# Patient Record
Sex: MALE | Race: WHITE | NOT HISPANIC OR LATINO | Employment: UNEMPLOYED | URBAN - METROPOLITAN AREA
[De-identification: names, ages, dates, MRNs, and addresses within clinical notes are randomized per-mention and may not be internally consistent; named-entity substitution may affect disease eponyms.]

---

## 2018-05-12 ENCOUNTER — HOSPITAL ENCOUNTER (EMERGENCY)
Facility: MEDICAL CENTER | Age: 23
End: 2018-05-12
Attending: EMERGENCY MEDICINE
Payer: COMMERCIAL

## 2018-05-12 ENCOUNTER — APPOINTMENT (OUTPATIENT)
Dept: RADIOLOGY | Facility: MEDICAL CENTER | Age: 23
End: 2018-05-12
Attending: EMERGENCY MEDICINE
Payer: COMMERCIAL

## 2018-05-12 VITALS
TEMPERATURE: 98.2 F | HEIGHT: 69 IN | SYSTOLIC BLOOD PRESSURE: 118 MMHG | OXYGEN SATURATION: 96 % | RESPIRATION RATE: 17 BRPM | WEIGHT: 205 LBS | BODY MASS INDEX: 30.36 KG/M2 | DIASTOLIC BLOOD PRESSURE: 54 MMHG | HEART RATE: 85 BPM

## 2018-05-12 DIAGNOSIS — F10.920 ALCOHOLIC INTOXICATION WITHOUT COMPLICATION (HCC): ICD-10-CM

## 2018-05-12 DIAGNOSIS — S61.209A FLEXOR TENDON LACERATION OF FINGER WITH OPEN WOUND, INITIAL ENCOUNTER: ICD-10-CM

## 2018-05-12 DIAGNOSIS — S61.219A FINGER LACERATION INVOLVING TENDON, INITIAL ENCOUNTER: ICD-10-CM

## 2018-05-12 DIAGNOSIS — S56.129A FLEXOR TENDON LACERATION OF FINGER WITH OPEN WOUND, INITIAL ENCOUNTER: ICD-10-CM

## 2018-05-12 PROCEDURE — 304999 HCHG REPAIR-SIMPLE/INTERMED LEVEL 1

## 2018-05-12 PROCEDURE — 700111 HCHG RX REV CODE 636 W/ 250 OVERRIDE (IP): Performed by: EMERGENCY MEDICINE

## 2018-05-12 PROCEDURE — 90715 TDAP VACCINE 7 YRS/> IM: CPT | Performed by: EMERGENCY MEDICINE

## 2018-05-12 PROCEDURE — 303485 HCHG DRESSING MEDIUM

## 2018-05-12 PROCEDURE — 303747 HCHG EXTRA SUTURE

## 2018-05-12 PROCEDURE — 99284 EMERGENCY DEPT VISIT MOD MDM: CPT

## 2018-05-12 PROCEDURE — 90471 IMMUNIZATION ADMIN: CPT

## 2018-05-12 PROCEDURE — 700102 HCHG RX REV CODE 250 W/ 637 OVERRIDE(OP): Performed by: EMERGENCY MEDICINE

## 2018-05-12 PROCEDURE — 304217 HCHG IRRIGATION SYSTEM

## 2018-05-12 PROCEDURE — 73140 X-RAY EXAM OF FINGER(S): CPT | Mod: LT

## 2018-05-12 PROCEDURE — A9270 NON-COVERED ITEM OR SERVICE: HCPCS | Performed by: EMERGENCY MEDICINE

## 2018-05-12 RX ORDER — BUPIVACAINE HYDROCHLORIDE 2.5 MG/ML
10 INJECTION, SOLUTION EPIDURAL; INFILTRATION; INTRACAUDAL ONCE
Status: COMPLETED | OUTPATIENT
Start: 2018-05-12 | End: 2018-05-12

## 2018-05-12 RX ORDER — AMOXICILLIN AND CLAVULANATE POTASSIUM 875; 125 MG/1; MG/1
1 TABLET, FILM COATED ORAL ONCE
Status: COMPLETED | OUTPATIENT
Start: 2018-05-12 | End: 2018-05-12

## 2018-05-12 RX ORDER — AMOXICILLIN AND CLAVULANATE POTASSIUM 875; 125 MG/1; MG/1
1 TABLET, FILM COATED ORAL 2 TIMES DAILY
Qty: 20 TAB | Refills: 0 | Status: SHIPPED | OUTPATIENT
Start: 2018-05-12 | End: 2018-05-22

## 2018-05-12 RX ADMIN — BUPIVACAINE HYDROCHLORIDE 10 ML: 2.5 INJECTION, SOLUTION EPIDURAL; INFILTRATION; INTRACAUDAL; PERINEURAL at 02:16

## 2018-05-12 RX ADMIN — AMOXICILLIN AND CLAVULANATE POTASSIUM 1 TABLET: 875; 125 TABLET, FILM COATED ORAL at 03:34

## 2018-05-12 RX ADMIN — CLOSTRIDIUM TETANI TOXOID ANTIGEN (FORMALDEHYDE INACTIVATED), CORYNEBACTERIUM DIPHTHERIAE TOXOID ANTIGEN (FORMALDEHYDE INACTIVATED), BORDETELLA PERTUSSIS TOXOID ANTIGEN (GLUTARALDEHYDE INACTIVATED), BORDETELLA PERTUSSIS FILAMENTOUS HEMAGGLUTININ ANTIGEN (FORMALDEHYDE INACTIVATED), BORDETELLA PERTUSSIS PERTACTIN ANTIGEN, AND BORDETELLA PERTUSSIS FIMBRIAE 2/3 ANTIGEN 0.5 ML: 5; 2; 2.5; 5; 3; 5 INJECTION, SUSPENSION INTRAMUSCULAR at 01:01

## 2018-05-12 ASSESSMENT — LIFESTYLE VARIABLES: DO YOU DRINK ALCOHOL: NO

## 2018-05-12 ASSESSMENT — PAIN SCALES - GENERAL: PAINLEVEL_OUTOF10: 3

## 2018-05-12 NOTE — ED TRIAGE NOTES
Sean Cintron  22 y.o.  Chief Complaint   Patient presents with   • Laceration     L 5th finger     BIB EMS for above.    Patient was at his formal dancing waving his hand in the air and hit a glass, lacerating the distal part of his L 5th finger. Bleeding controlled PTA with dressing.    No movement noted to distal portion of finger. + loss of sensation to distal portion of L 5th finger. Cap refill < 2 sec.    Last tetanus shot unknown.    Chart up for ERP.

## 2018-05-12 NOTE — DISCHARGE INSTRUCTIONS
You were seen and evaluated in the Emergency Department at [facility] for:     Severe cut to her left pinky finger    You had the following tests and studies:    X-ray showed no obvious broken bones or foreign bodies    You received the following medications:    Bupivacaine injection, tetanus update, Augmentin    You received the following prescriptions:    Augmentin, and antibiotic to take twice a day for the next 10 days  ----------------------------    Please make sure to follow up with:    A hand surgeon in 2 days for recheck and definitive care. We're concerned that he had lacerated the tendon of your finger and he will not be able to move your finger again until you have definitive surgery. Return to the ER immediately for any skin changes including paleness or blueness, worsening redness or swelling, drainage of pus, redness running up the arm, or any other new or worsening symptoms.    Good luck, we hope you get better soon!  ----------------------------    We always encourage patients to return IMMEDIATELY if they have:  Increased or changing pain, passing out, fevers over 100.4 (taken in your mouth or rectally) for more than 2 days, redness or swelling of skin or tissues, feeling like your heart is beating fast, chest pain that is new or worsening, trouble breathing, feeling like your throat is closing up and can not breath, inability to walk, weakness of any part of your body, new dizziness, severe bleeding that won't stop from any part of your body, if you can't eat or drink, or if you have any other concerns.   If you feel worse, please know that you can always return with any questions, concerns, worse symptoms, or you are feeling unsafe. We certainly cannot say for sure that we have ruled out every illness or dangerous disease, but we feel that at this specific time, your exam, tests, and vital signs like heart rate and blood pressure are safe for discharge.         Tendon Injury  Tendons  are strong, cordlike structures that connect muscle to bone. Tendons are made up of woven fibers, like a rope. A tendon injury is a tear (rupture) of the tendon. The rupture may be partial (only a few of the fibers in your tendon rupture) or complete (your entire tendon ruptures).  CAUSES   Tendon injuries can be caused by high-stress activities, such as sports. They also can be caused by a repetitive injury or by a single injury from an excessive, rapid force.  SYMPTOMS   Symptoms of tendon injury include pain when you move the joint close to the tendon. Other symptoms are swelling, redness, and warmth.  DIAGNOSIS   Tendon injuries often can be diagnosed by physical exam. However, sometimes an X-ray exam or advanced imaging, such as magnetic resonance imaging (MRI), is necessary to determine the extent of the injury.  TREATMENT   Partial tendon ruptures often can be treated with immobilization. A splint, bandage, or removable brace usually is used to immobilize the injured tendon. Most injured tendons need to be immobilized for 1-2 months before they are completely healed. Complete tendon ruptures may require surgical reattachment.     This information is not intended to replace advice given to you by your health care provider. Make sure you discuss any questions you have with your health care provider.     Document Released: 01/25/2006 Document Revised: 12/06/2012 Document Reviewed: 03/10/2013  [employer] Interactive Patient Education ©2016 [employer]      Laceration Care, Adult  A laceration is a cut that goes through all of the layers of the skin and into the tissue that is right under the skin. Some lacerations heal on their own. Others need to be closed with stitches (sutures), staples, skin adhesive strips, or skin glue. Proper laceration care minimizes the risk of infection and helps the laceration to heal better.  HOW TO CARE FOR YOUR LACERATION  If sutures or staples were used:  · Keep the wound clean and  dry.  · If you were given a bandage (dressing), you should change it at least one time per day or as told by your health care provider. You should also change it if it becomes wet or dirty.  · Keep the wound completely dry for the first 24 hours or as told by your health care provider. After that time, you may shower or bathe. However, make sure that the wound is not soaked in water until after the sutures or staples have been removed.  · Clean the wound one time each day or as told by your health care provider:  ¨ Wash the wound with soap and water.  ¨ Rinse the wound with water to remove all soap.  ¨ Pat the wound dry with a clean towel. Do not rub the wound.  · After cleaning the wound, apply a thin layer of antibiotic ointment as told by your health care provider. This will help to prevent infection and keep the dressing from sticking to the wound.  · Have the sutures or staples removed as told by your health care provider.  If skin adhesive strips were used:  · Keep the wound clean and dry.  · If you were given a bandage (dressing), you should change it at least one time per day or as told by your health care provider. You should also change it if it becomes dirty or wet.  · Do not get the skin adhesive strips wet. You may shower or bathe, but be careful to keep the wound dry.  · If the wound gets wet, pat it dry with a clean towel. Do not rub the wound.  · Skin adhesive strips fall off on their own. You may trim the strips as the wound heals. Do not remove skin adhesive strips that are still stuck to the wound. They will fall off in time.  If skin glue was used:  · Try to keep the wound dry, but you may briefly wet it in the shower or bath. Do not soak the wound in water, such as by swimming.  · After you have showered or bathed, gently pat the wound dry with a clean towel. Do not rub the wound.  · Do not do any activities that will make you sweat heavily until the skin glue has fallen off on its own.  · Do  not apply liquid, cream, or ointment medicine to the wound while the skin glue is in place. Using those may loosen the film before the wound has healed.  · If you were given a bandage (dressing), you should change it at least one time per day or as told by your health care provider. You should also change it if it becomes dirty or wet.  · If a dressing is placed over the wound, be careful not to apply tape directly over the skin glue. Doing that may cause the glue to be pulled off before the wound has healed.  · Do not pick at the glue. The skin glue usually remains in place for 5-10 days, then it falls off of the skin.  General Instructions  · Take over-the-counter and prescription medicines only as told by your health care provider.  · If you were prescribed an antibiotic medicine or ointment, take or apply it as told by your doctor. Do not stop using it even if your condition improves.  · To help prevent scarring, make sure to cover your wound with sunscreen whenever you are outside after stitches are removed, after adhesive strips are removed, or when glue remains in place and the wound is healed. Make sure to wear a sunscreen of at least 30 SPF.  · Do not scratch or pick at the wound.  · Keep all follow-up visits as told by your health care provider. This is important.  · Check your wound every day for signs of infection. Watch for:  ¨ Redness, swelling, or pain.  ¨ Fluid, blood, or pus.  · Raise (elevate) the injured area above the level of your heart while you are sitting or lying down, if possible.  SEEK MEDICAL CARE IF:  · You received a tetanus shot and you have swelling, severe pain, redness, or bleeding at the injection site.  · You have a fever.  · A wound that was closed breaks open.  · You notice a bad smell coming from your wound or your dressing.  · You notice something coming out of the wound, such as wood or glass.  · Your pain is not controlled with medicine.  · You have increased redness,  swelling, or pain at the site of your wound.  · You have fluid, blood, or pus coming from your wound.  · You notice a change in the color of your skin near your wound.  · You need to change the dressing frequently due to fluid, blood, or pus draining from the wound.  · You develop a new rash.  · You develop numbness around the wound.  SEEK IMMEDIATE MEDICAL CARE IF:  · You develop severe swelling around the wound.  · Your pain suddenly increases and is severe.  · You develop painful lumps near the wound or on skin that is anywhere on your body.  · You have a red streak going away from your wound.  · The wound is on your hand or foot and you cannot properly move a finger or toe.  · The wound is on your hand or foot and you notice that your fingers or toes look pale or bluish.     This information is not intended to replace advice given to you by your health care provider. Make sure you discuss any questions you have with your health care provider.     Document Released: 12/18/2006 Document Revised: 05/03/2016 Document Reviewed: 12/14/2015  [employer] Interactive Patient Education ©2016 [employer]

## 2018-05-12 NOTE — ED PROVIDER NOTES
ED PROVIDER NOTE     Scribed for Wellington Subramanian M.D. by Luis Angel Valero. 5/12/2018, 12:24 AM.    CHIEF COMPLAINT  Chief Complaint   Patient presents with   • Laceration     L 5th finger       HPI    Primary care provider: None  Means of arrival: EMS  History obtained from: patient, girlfriend  History limited by: none    Sean Cintron is a 22 y.o. male who presents with left pinky laceration after cutting his finger on glass tonight. Per patient, he was dancing with his hands in the air when he hit someone's glass, cutting his left pinky finger. He endorses associated loss of motor function, and patient states he is now unable to flex his pinky. He denies numbness, head injury, or chest pain. Patient notes he was bleeding a significant amount initially, but he states bleeding is controlled with pressure prior to arrival. Right hand dominant. No alleviating measures aside from pressure attempted. No noted aggravating factors. Minimal pain is present. Unknown last tetanus.     REVIEW OF SYSTEMS  Cardiovascular: Negative for chest pain  Genitourinary: Negative for dysuria or flank pain.   Musculoskeletal: Negative for back pain or joint pain.   Skin: Positive for laceration to left pinky.  Neurological: Positive for loss of flexor motor function to left pinky. Negative for numbness.    E.     PAST MEDICAL HISTORY   has a past medical history of Hypertension.    PAST FAMILY HISTORY  History reviewed. No pertinent family history.    SOCIAL HISTORY  Social History     Social History Main Topics   • Smoking status: Never Smoker   • Smokeless tobacco: Never Used   • Alcohol use Yes   • Drug use: Yes     Types: Inhaled      Comment: occasional marijuana   • Sexual activity: None       SURGICAL HISTORY  patient denies any surgical history    CURRENT MEDICATIONS  Home Medications     Reviewed by Mary Jo Rojas R.N. (Registered Nurse) on 05/12/18 at 0016  Med List Status: Complete   Medication Last Dose Status       "  Patient Dima Taking any Medications                       ALLERGIES  No Known Allergies    PHYSICAL EXAM  VITAL SIGNS: Pulse 99   Temp 36.8 °C (98.2 °F)   Resp 16   Ht 1.753 m (5' 9\")   Wt 93 kg (205 lb)   SpO2 94%   BMI 30.27 kg/m²    Pulse ox interpretation: On room air, I interpret this pulse ox as normal.  Constitutional: No distress. Well-nourished.  HENT: Head is atraumatic. Mucous membranes moist.   Eyes: Conjunctivae are normal. EOMI. Injected sclerae.  Respiratory: No respiratory distress. Equal chest expansion.   Cardiovascular: RRR, no m/r/g.  Abdomen: Soft, nontender.  Musculoskeletal: Normal range of motion. No edema.   Neurological: Alert. No focal deficits noted.    Skin: 2.5 cm laceration over the palmar surface of the middle phalanx of the left pinky finger. Flexor tendon is exposed and completely lacerated. Capillary refill is less than 2 seconds, and sensation is intact.   Psych: Appropriate mood. Normal affect.     DIAGNOSTIC STUDIES / PROCEDURES    RADIOLOGY  DX-FINGER(S) 2+ LEFT   Final Result      Deep soft tissue laceration along the fifth digit extending to the fifth DIP joint.      No acute osseous abnormality.        On my read the lac abuts the PIP joint, not DIP.     PROCEDURES  Laceration Repair Procedure Note    Indication: Laceration    Procedure: The patient was placed in the appropriate position and anesthesia around the laceration was obtained with a full digital block of the left short (pinkie) finger using 1.5 cc of 0.25% bupivacaine. The area was then cleansed using chlorhexidine, debrided, irrigated with 2.5L high pressure normal saline, explored with no foreign bodies discovered and flexor tendon injury was noted without any obvious joint violation, and draped in a sterile fashion. The laceration was closed with 4-0 Ethilon using interrupted sutures. There were no additional lacerations requiring repair. The wound area was then dressed with bacitracin, a bandage, " vaseline soaked gauze and splinted.      Total repaired wound length: 2.5 cm.     Other Items: Suture count: 7    The patient tolerated the procedure well.                                                                                                .  Complications: None    COURSE & MEDICAL DECISION MAKING    This is a 22 y.o. male who presents with finger laceration, concern for tendon injury as can't flex distal to lac.     Differential Diagnosis includes but is not limited to:  Finger laceration, tendon laceration, foreign body, fracture, vascular injury    ED Course:  12:27 AM - Patient was seen and examined at bedside. Ordered hand x-ray to evaluate. I will also plan to perform a partial laceration repair procedure vs operative management. Discussed the plan of care with the patient. He understood and verbalized agreement. Tetanus updated.    Xray without obvious fracture or foreign body.     1:34 AM - Paged Hand    1:36 AM - I discussed the patient's case and the above findings with Dr. Harrison (Hand/Ortho) who recommends the patient's wound is washed and he is discharged with antibiotics. He states the patient's tendon injury may be repaired on an outpatient basis even if open joint is present.    2:00 AM -laceration repair, see procedure note.  Prior to closure the wound was extensively cleansed and explored I do not appreciate any obvious foreign bodies, however I had a long discussion with the patient about the possibility of retained foreign body, my concern for total tendon laceration, and the need for urgent hand surgery.  All the patient does report drinking alcohol tonight, after several hours in the ER he is clinically sober and can verbalize back to me the plan of care.  He understands that he could lose total function of his finger, or even require amputation if he does not care for this well.  Wound care instructions given, need to comply with antibiotics, and need to evaluate for any skin  changes suggestive of acute vascular injury were provided.  The patient and his girlfriend understand, agree with, and appreciate the plan of care.  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  After laceration repair the patient has normal capillary refill, no active bleeding, prior to digital block his distal sensation was intact.  I feel that he is stable for discharge home, antibiotic prescription provided, return for any new or worsening symptoms immediately.    Medications - No data to display    FINAL IMPRESSION  1. Finger laceration involving tendon, initial encounter    2. Alcoholic intoxication without complication (HCC)    3. Flexor tendon laceration of finger with open wound, initial encounter        PRESCRIPTIONS  Discharge Medication List as of 5/12/2018  3:37 AM      START taking these medications    Details   amoxicillin-clavulanate (AUGMENTIN) 875-125 MG Tab Take 1 Tab by mouth 2 times a day for 10 days., Disp-20 Tab, R-0, Print Rx Paper             FOLLOW UP  [facility], Emergency Dept  [address] [address] [phone]  In 1 day  If symptoms worsen    [facility] [address]  [phone]    Schedule an appointment as soon as possible for a visit in 2 days  for definitive care, or with a hand surgeon at home in [place]        -DISCHARGE-     The patient is referred to Ortho/hand surgeon for definitive operative management    Pertinent Imaging studies reviewed and verified by myself, as well as nursing notes and the patient's past medical, family, and social histories (See chart for details).    Results, exam findings, clinical  impression, presumed diagnosis, treatment options, and strict return precautions were discussed with the patient, and they verbalized understanding, agreed with, and appreciated the plan of care.    ILuis Angel (Scribe), am scribing for, and in the presence of, Wellington Subramanian M.D..    Electronically signed by: Luis Angel Valero (Scribe), 5/12/2018    IWellington M.D. personally performed the services described in this documentation, as scribed by Luis Angel Valero in my presence, and it is both accurate and complete.    Portions of this record were made with voice recognition software.  Despite my review, spelling/grammar/context errors may still remain.  Interpretation of this chart should be taken in this context.    The note accurately reflects work and decisions made by me.  Wellington Subramanian  5/12/2018  12:06 PM

## 2018-05-12 NOTE — ED NOTES
Laceration sutured at bedside by Dr. Subramanian. Patient tolerated well with no complaints of pain/discomfort.